# Patient Record
Sex: MALE | Race: WHITE | Employment: FULL TIME | URBAN - NONMETROPOLITAN AREA
[De-identification: names, ages, dates, MRNs, and addresses within clinical notes are randomized per-mention and may not be internally consistent; named-entity substitution may affect disease eponyms.]

---

## 2017-01-04 ENCOUNTER — HOSPITAL ENCOUNTER (OUTPATIENT)
Dept: CARDIAC REHAB | Facility: HOSPITAL | Age: 52
Setting detail: THERAPIES SERIES
End: 2017-01-04
Attending: FAMILY MEDICINE
Payer: COMMERCIAL

## 2017-01-04 PROCEDURE — 93797 PHYS/QHP OP CAR RHAB WO ECG: CPT

## 2017-01-04 PROCEDURE — 93798 PHYS/QHP OP CAR RHAB W/ECG: CPT

## 2017-01-04 PROCEDURE — 40000116 ZZH STATISTIC OP CR VISIT

## 2017-01-04 PROCEDURE — 40000575 ZZH STATISTIC OP CARDIAC VISIT #2

## 2017-01-06 ENCOUNTER — HOSPITAL ENCOUNTER (OUTPATIENT)
Dept: CARDIAC REHAB | Facility: HOSPITAL | Age: 52
Setting detail: THERAPIES SERIES
End: 2017-01-06
Attending: FAMILY MEDICINE
Payer: COMMERCIAL

## 2017-01-06 PROCEDURE — 93798 PHYS/QHP OP CAR RHAB W/ECG: CPT

## 2017-01-06 PROCEDURE — 40000116 ZZH STATISTIC OP CR VISIT

## 2017-01-09 ENCOUNTER — HOSPITAL ENCOUNTER (OUTPATIENT)
Dept: CARDIAC REHAB | Facility: HOSPITAL | Age: 52
Setting detail: THERAPIES SERIES
End: 2017-01-09
Attending: FAMILY MEDICINE
Payer: COMMERCIAL

## 2017-01-09 PROCEDURE — 40000116 ZZH STATISTIC OP CR VISIT

## 2017-01-09 PROCEDURE — 93798 PHYS/QHP OP CAR RHAB W/ECG: CPT

## 2017-01-11 ENCOUNTER — HOSPITAL ENCOUNTER (OUTPATIENT)
Dept: CARDIAC REHAB | Facility: HOSPITAL | Age: 52
Setting detail: THERAPIES SERIES
End: 2017-01-11
Attending: FAMILY MEDICINE
Payer: COMMERCIAL

## 2017-01-11 PROCEDURE — 40000116 ZZH STATISTIC OP CR VISIT

## 2017-01-11 PROCEDURE — 93798 PHYS/QHP OP CAR RHAB W/ECG: CPT

## 2017-01-16 ENCOUNTER — HOSPITAL ENCOUNTER (OUTPATIENT)
Dept: CARDIAC REHAB | Facility: HOSPITAL | Age: 52
Setting detail: THERAPIES SERIES
End: 2017-01-16
Attending: FAMILY MEDICINE
Payer: COMMERCIAL

## 2017-01-16 PROCEDURE — 93798 PHYS/QHP OP CAR RHAB W/ECG: CPT

## 2017-01-16 PROCEDURE — 40000116 ZZH STATISTIC OP CR VISIT

## 2017-01-18 ENCOUNTER — HOSPITAL ENCOUNTER (OUTPATIENT)
Dept: CARDIAC REHAB | Facility: HOSPITAL | Age: 52
Setting detail: THERAPIES SERIES
End: 2017-01-18
Attending: FAMILY MEDICINE
Payer: COMMERCIAL

## 2017-01-18 PROCEDURE — 40000116 ZZH STATISTIC OP CR VISIT

## 2017-01-18 PROCEDURE — 93798 PHYS/QHP OP CAR RHAB W/ECG: CPT

## 2017-01-20 ENCOUNTER — HOSPITAL ENCOUNTER (OUTPATIENT)
Dept: CARDIAC REHAB | Facility: HOSPITAL | Age: 52
Setting detail: THERAPIES SERIES
End: 2017-01-20
Attending: FAMILY MEDICINE
Payer: COMMERCIAL

## 2017-01-20 VITALS — HEIGHT: 73 IN | BODY MASS INDEX: 29.42 KG/M2 | WEIGHT: 222 LBS

## 2017-01-20 PROCEDURE — 93798 PHYS/QHP OP CAR RHAB W/ECG: CPT

## 2017-01-20 PROCEDURE — 40000116 ZZH STATISTIC OP CR VISIT

## 2017-01-20 ASSESSMENT — 6 MINUTE WALK TEST (6MWT)
FEMALE CALC: 1746.05
MALE CALC: 2167.61
GENDER SELECTION: MALE
PREDICTED: 2180.82
TOTAL DISTANCE WALKED (FT): 1590

## 2017-01-20 NOTE — PROGRESS NOTES
" 01/20/17 1500   Session   Session 60 Day Individualized Treatment Plan   Certified through this date 02/18/17   Cardiac Rehab Assessment   Cardiac Rehab Assessment 1/20/2017: Patient has attended 20 sessions of Phase II Cardiac Rehab thus far and is doing very well. Patient continues to tolerate ever increasing workloads without adverse effects. Patient continues to progress towards personal goals, if not already reaching them. He is currently reaching peak MET levels of up 16.5 METs during a regular exercise session. Patient plans to discharge from Phase II Cardiac Rehab in the next couple of weeks and will continue to implement a home exercise program of walking and using his weight equipment. Skilled services are necessary in order to monitor CV response to exercise, educate on personal risk factors, and provide behavior change counseling as necessary in order to support patient in any way that can improve quality of life, help achieve personal goals, and decrease CAD risk.    General Information   Treatment Diagnosis NSTEMI   Date of Treatment Diagnosis 11/15/16   Secondary Treatment Diagnosis Stent   Significant Past CV History None   Other Medical History Multiple Schlerosis, BPH   Lead up symptoms Patient had been short of breath recently. Then had \"heartburn with pressure\". Then pain went to right arm and then left, then through back of shoulders.    Hospital Location [facility]   Hospital Discharge Date 11/17/16   Signs and Symptoms Post Hospital Discharge None   Outpatient Cardiac Rehab Start Date 11/28/16   Primary Physician Genaro   Primary Physician Follow Up Completed   Surgeon Destiny   Cardiologist Amilcar   Cardiologist Follow Up Scheduled   Risk Stratification Low   Summary of Cath Report   Summary of Cath Report Available   Date Performed 11/16/16   Left Main no angiographically significant disease   LAD There is diffuse 60% stenosis of the mLAD with mild 30% stenosis in the mid to distal segment " "  LCX Diffuse luminal irregularieties but no angiographically signifcant stenosis.    RCA Dominant vessel. There is a 99% stenosis in the distal RCA and a 70% stenosis in the mRCA.   Living and Work Status    Living Arrangements and Social Status house;spouse   Support System Live with an adult   Return to Employment Yes   Occupation    Preventative Medications   CMS recommended medications Antiplatelets;Beta Blocker;Lipid Lowering;Influenza vaccination   Falls Screen   Have you fallen two or more times in the past year? No   Have you fallen and had an injury in the past year? No   Comment 1/20: Patient is not at risk for falls at this time.    Pain   Patient Currently in Pain No   Physical Assessments   Incisions WNL   Edema None   Right Lung Sounds not assessed   Left Lung Sounds not assessed   Limitations No limitations   Comments Patient is not limited by his MS.    Individualized Treatment Plan   Monitored Sessions Scheduled 24   Monitored Sessions Attended 20   Oxygen   Supplemental Oxygen needed No   Nutrition Management - Weight Management   Assessment Re-assessment   Age 51   Weight 100.699 kg (222 lb)   Height 1.854 m (6' 0.99\")   BMI (Calculated) 29.36   Initial Rate Your Plate Score. Dietary tool to assess eating patterns. Scores range from 24 to 72. The higher the score the healthier the eating pattern. 48   Weight Management Comments Patient feels much better about his diet and weight. Patient attended Nutrition class.   Nutrition Management - Lipids   Lipids Labs Not Available   Prescribed Lipid Medication Yes   Statin Intensity High Intensity   Lipid Comments Patient adherent to taking all medications as prescribed   Nutrition Management - Diabetes   Diabetes No   Nutrition Management Summary   Dietary Recommendations Low Cholesterol;Low Sodium;Mediterranean   Stages of Change for Diet Compliance Action   Interventions Planned Attend Nutrition Education Class(es)   Interventions In Progress or " Completed Educated on Benefits of Exercise;Attended Nutrition Class(es)   Patient Goals Goal #1;Goal #2   Goal #1 Description Patient would like to increase his knowledge of healthy eating by attending education classes and implementing what he has learned. He would liked to increase his RYP score.   Goal #1 Target Date 12/31/16   Goal #1 Date Met 12/23/16   Goal #1 Progress Towards Goal Patient has attended nutrition class and feels that he is much more knowledgeable about diet.   Goal #2 Description Patient would like to further his knowledge on healthy diet and maintain his current heart healthy diet and increase his RYP score by incorporating more fruits and vegetables into diet.   Goal #2 Target Date 03/01/17   Goal #2 Progress Towards Goal Patient continues to try and learn more about diet and exercise. Patient tries to incorporate what he's learned into his daily routine.   Nutrition Summary Comments Patient doing very well with heart healthy diet.   Nutrition Target Outcome Total Chol < 150, HDL > 40 (M), HDL > 50 (W), LDL < 70, Trig < 150   Psychosocial Management   Psychosocial Assessment Re-assessment   Is there history of clinical depression or increased risk of depression? No previous history   Current Level of Stress per Patient Report Denies   Current Coping Skills Uses Stress Management/Relaxation Techniques   Initial Patient Health Questionnaire -9 Score (PHQ-9) for depression. 5-9 Minimal symptoms, 10-14 Minor depression, 15-19 Major depression, moderately severe, > 20 Major depression, severe  0  (Initial PHQ-9 score was entered incorrectly.)   Initial [facility] Survey score.  Quality of Life:   If total score > 25 review individual areas where patient rated a 4 or 5.  Consider patients current medical condition and what role that plays on the score.   Adjust treatment protocol to improve areas of concern.  Consider the following:  PHQ9 score, DASI, and re-assessment within the next 30 days  to assist with developing treatments.  13   Stages of Change Action   Interventions Planned Patient denies need for intervention at this time.   Patient Goal No   Psychosocial Comments Patient has good coping techniques.    Other Core Components - Hypertension   History of or Diagnosis of Hypertension Yes   Currently taking Anti-Hypertensives Yes;Beta blocker   Hypertension Comments Patient is compliant with prescribed medications.    Other Core Components - Tobacco   History of Tobacco Use Yes   Quit Date or Planned Quit Date 08/04/00   Tobacco Use Status Former (Quit > 6 mo ago)   Tobacco Habit Cigarettes   Tobacco Use per Day (average) 1+   Years of Tobacco Use 20   Stages of Change Maintenance   Tobacco Comments Patient does not feel the urge to smoke.    Other Core Components Summary   Interventions Planned None;Instruct patient on the DASH diet;Educate on importance of maintaining low sodium diet   Interventions In Progress or Completed Attended Nutrition class(es);Instructed on DASH diet   Patient Goals No   Other Core Components Comments Patient would like to continue to be educated on heart healthy diet   Other Core Components Target Outcome BP < 140/90 or < 130/80 with DM or CKD;Compliance with Diet, Medications and Symptom Management to allow for stable Heart Failure   Activity/Exercise History   Activity/Exercise Assessment Re-assessment   Activity/Exercise Status prior to event? Was Physically Active   Number of Days Currently participating in Moderate Physical Activity? 7   Number of Days Currently performing  Aerobic Exercise (including rehab)? 2   Number of Minutes per Session Currently of Aerobic Exercise (average)? 20-30   Current Stage of Change (Physical Activity) Action   Current Stage of Change (Aerobic Exercise) Action   Patient Goals Goal #1;Goal #2   Goal #1 Description Patient would like to increase his aerobic exercise to 150 minutes per week by attending Cardiac Rehab and increasing his  home exercise.    Goal #1 Target Date 12/31/16   Goal #1 Date Met 12/23/16   Goal #1 Progress Towards Goal Patient was walking at home daily for 30+ mins/day and attending CR 3x/wk   Goal #2 Description Patient would like to increase exercise tolerance by continually increasing workloads in CR and continued home exercise of walking and using home weight machine.    Goal #2 Target Date 03/01/17   Goal #2 Date Met 01/20/17   Goal #2 Progress Towards Goal Patient continues to accel in CR sessions and is continuing to exercise outside of CR sessions.    Activity/Exercise Comments Patient is agreeable to work on increasing his activity level.    Activity/Exercise Target Outcome An Accumulation of 150  Minutes of Aerobic Activity per Week   Exercise Assessment   6 Minute Walk Predicted - Gender Selection Male   6 Minute Walk Predicted (Male) 2167.61   6 Minute Walk Predicted (Female) 1746.05   Initial 6 Minute Walk Distance (Feet) 1590 ft   Resting HR 55 bpm   Exercise HR 88 bpm   Post Exercise HR 59 bpm   Resting /68 mmHg   Exercise /70 mmHg   Post Exercise /74 mmHg   Pre SpO2 95   While Exercising SpO2 95   Effort Rating 1   Current MET Level 3.3   MET Level Goal 5-6   ECG Rhythm Sinus bradycardia   Ectopy PVCs   Current Symptoms Denies symptoms   Limitations/Restrictions None   Exercise Prescription   Mode Treadmill;Rowing machine;Calisthenics;Recumbant bike;Nustep;Arm Ergometer;Weights   Duration/Time 30-45 min   Frequency 3 daysweek   THR (85% of age predicted max HR) 143.65   OMNI Effort Rating (0-10 Scale) 4-6/10   Progression Continuous bouts;Total exercise time of 30-45 minutes;Aerobic exercise to OMNI rating of 5-7, and heart rate at or below target;Progress peak intensity by 1/2 MET per week   Comments Patient has a history of exercising.    Recommended Home Exercise   Type of Exercise Walking;Weights   Frequency (days per week) 2-3   Duration (minutes per session) 30-45 min   Effort Rating  Recommended 4-6/10   30 Day Exercise Plan Patient would like to increase exercise workloads in order to improve exercise tolerance.   Current Home Exercise   Type of Exercise Walking   Frequency (days per week) 7   Duration (minutes per session) 20-30   Follow-up/On-going Support   Provider follow-up needed on the following No follow-up needed   Learning Assessment   Learner Patient   Primary Language English   Preferred Learning Style Reading;Demonstration   Barriers to Learning No barriers noted   Patient Education   Education recommended Anatomy and Physiology of the Heart;Blood Pressure;Exercise Principles;Medication Overview;Nutrition;Risk Factors   Education classes attended Stress Management;Nutrition;Medication Overview   Education Comments Patient seems interested in attending educational offerings.    Physician cosignature/electronic signature indicates approval of this ITP document. I have established, reviewed and made necessary changes to the individualized treatment plan and exercise prescription for this patient.

## 2017-01-23 ENCOUNTER — HOSPITAL ENCOUNTER (OUTPATIENT)
Dept: CARDIAC REHAB | Facility: HOSPITAL | Age: 52
Setting detail: THERAPIES SERIES
End: 2017-01-23
Attending: FAMILY MEDICINE
Payer: COMMERCIAL

## 2017-01-23 PROCEDURE — 40000116 ZZH STATISTIC OP CR VISIT

## 2017-01-23 PROCEDURE — 93798 PHYS/QHP OP CAR RHAB W/ECG: CPT

## 2017-01-25 ENCOUNTER — HOSPITAL ENCOUNTER (OUTPATIENT)
Dept: CARDIAC REHAB | Facility: HOSPITAL | Age: 52
Setting detail: THERAPIES SERIES
End: 2017-01-25
Attending: INTERNAL MEDICINE
Payer: COMMERCIAL

## 2017-01-25 PROCEDURE — 40000116 ZZH STATISTIC OP CR VISIT

## 2017-01-25 PROCEDURE — 93798 PHYS/QHP OP CAR RHAB W/ECG: CPT

## 2017-01-27 ENCOUNTER — HOSPITAL ENCOUNTER (OUTPATIENT)
Dept: CARDIAC REHAB | Facility: HOSPITAL | Age: 52
Setting detail: THERAPIES SERIES
End: 2017-01-27
Attending: INTERNAL MEDICINE
Payer: COMMERCIAL

## 2017-01-27 PROCEDURE — 93798 PHYS/QHP OP CAR RHAB W/ECG: CPT

## 2017-01-27 PROCEDURE — 40000116 ZZH STATISTIC OP CR VISIT

## 2017-01-30 ENCOUNTER — HOSPITAL ENCOUNTER (OUTPATIENT)
Dept: CARDIAC REHAB | Facility: HOSPITAL | Age: 52
Setting detail: THERAPIES SERIES
End: 2017-01-30
Attending: INTERNAL MEDICINE
Payer: COMMERCIAL

## 2017-01-30 PROCEDURE — 93798 PHYS/QHP OP CAR RHAB W/ECG: CPT

## 2017-01-30 PROCEDURE — 40000116 ZZH STATISTIC OP CR VISIT

## 2017-01-31 ENCOUNTER — HOSPITAL ENCOUNTER (OUTPATIENT)
Dept: CARDIAC REHAB | Facility: HOSPITAL | Age: 52
Setting detail: THERAPIES SERIES
End: 2017-01-31
Attending: INTERNAL MEDICINE
Payer: COMMERCIAL

## 2017-01-31 VITALS — BODY MASS INDEX: 29.5 KG/M2 | HEIGHT: 73 IN | WEIGHT: 222.6 LBS

## 2017-01-31 PROCEDURE — 40000116 ZZH STATISTIC OP CR VISIT

## 2017-01-31 PROCEDURE — 93798 PHYS/QHP OP CAR RHAB W/ECG: CPT

## 2017-01-31 ASSESSMENT — 6 MINUTE WALK TEST (6MWT)
TOTAL DISTANCE WALKED (FT): 1590
PREDICTED: 2179.24
FEMALE CALC: 1744
MALE CALC: 2166.03
TOTAL DISTANCE WALKED (FT): 1738
GENDER SELECTION: MALE
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
